# Patient Record
Sex: MALE | Race: WHITE | NOT HISPANIC OR LATINO | Employment: FULL TIME | URBAN - METROPOLITAN AREA
[De-identification: names, ages, dates, MRNs, and addresses within clinical notes are randomized per-mention and may not be internally consistent; named-entity substitution may affect disease eponyms.]

---

## 2020-09-08 ENCOUNTER — OFFICE VISIT (OUTPATIENT)
Dept: FAMILY MEDICINE CLINIC | Facility: CLINIC | Age: 20
End: 2020-09-08

## 2020-09-08 VITALS
WEIGHT: 197 LBS | BODY MASS INDEX: 29.18 KG/M2 | HEIGHT: 69 IN | HEART RATE: 66 BPM | RESPIRATION RATE: 16 BRPM | TEMPERATURE: 98.4 F | OXYGEN SATURATION: 99 % | SYSTOLIC BLOOD PRESSURE: 106 MMHG | DIASTOLIC BLOOD PRESSURE: 78 MMHG

## 2020-09-08 DIAGNOSIS — R10.84 GENERALIZED ABDOMINAL PAIN: Primary | ICD-10-CM

## 2020-09-08 DIAGNOSIS — R19.7 DIARRHEA, UNSPECIFIED TYPE: ICD-10-CM

## 2020-09-08 PROCEDURE — 99203 OFFICE O/P NEW LOW 30 MIN: CPT | Performed by: PHYSICIAN ASSISTANT

## 2020-09-08 NOTE — PROGRESS NOTES
Subjective   Tim Montes De Oca is a 20 y.o. male.     History of Present Illness   Pt presents to establish care  Generalized abdominal pain off and on for the last several months. Worse after eating (about 15 minutes). Worse after eating spicy foods.   Having more loose stools right after meals. Still having normal bowel movements as well   US of gallbladder at  hospital last month which was normal. No CT scan or hida scan up to this appointment   Seeing specialist this Thursday at  with GI   Denies nausea or vomiting   Has been trying to eat healthier. Lake foods, chicken/ rice and salads. This has helped symptoms some   Does love spicy and hot foods, eating them since he was a kid    Not taking frequent NSAIDs  Does not drink alcohol    Denies hx of stomach ulcer   Labs in hospital were normal   Has been out of work since August 17th   Negative COVID testing in July ([facility], diarrhea, couldn't keep anything down)   Has not tried any medication for symptoms, either prescription or over the counter   Works for [employer] in [place] [employer]       The following portions of the patient's history were reviewed and updated as appropriate: allergies, current medications, past family history, past medical history, past social history, past surgical history and problem list.    Review of Systems   Constitutional: Negative.  Negative for chills, diaphoresis, fatigue and fever.   HENT: Negative.  Negative for congestion, ear discharge, ear pain, hearing loss, nosebleeds, postnasal drip, sinus pressure, sneezing and sore throat.    Eyes: Negative.    Respiratory: Negative.  Negative for cough, chest tightness, shortness of breath and wheezing.    Cardiovascular: Negative.  Negative for chest pain, palpitations and leg swelling.   Gastrointestinal: Positive for abdominal pain and diarrhea. Negative for abdominal distention, blood in stool, constipation, nausea and vomiting.   Genitourinary: Negative.  Negative for difficulty  "urinating, dysuria, flank pain, frequency, hematuria and urgency.   Musculoskeletal: Negative.  Negative for arthralgias, back pain, gait problem, joint swelling, myalgias, neck pain and neck stiffness.   Skin: Negative.  Negative for color change, pallor, rash and wound.   Neurological: Negative for dizziness, syncope, weakness, light-headedness, numbness and headaches.       Objective    Blood pressure 106/78, pulse 66, temperature 98.4 °F (36.9 °C), resp. rate 16, height 175.3 cm (69\"), weight 89.4 kg (197 lb), SpO2 99 %.     Physical Exam   Constitutional: He is oriented to person, place, and time. He appears well-developed and well-nourished.   HENT:   Head: Normocephalic and atraumatic.   Right Ear: External ear normal.   Left Ear: External ear normal.   Nose: Nose normal.   Mouth/Throat: Oropharynx is clear and moist. No oropharyngeal exudate.   Eyes: Conjunctivae are normal.   Neck: Normal range of motion. Neck supple. No tracheal deviation present. No thyromegaly present.   Cardiovascular: Normal rate, regular rhythm, normal heart sounds and intact distal pulses. Exam reveals no gallop and no friction rub.   No murmur heard.  Pulmonary/Chest: Effort normal and breath sounds normal. No respiratory distress. He has no wheezes. He has no rales. He exhibits no tenderness.   Abdominal: Soft. Bowel sounds are normal. He exhibits no distension and no mass. There is tenderness in the left upper quadrant. There is no rebound and no guarding. No hernia.   Lymphadenopathy:     He has no cervical adenopathy.   Neurological: He is alert and oriented to person, place, and time.   Skin: Skin is warm and dry.   Psychiatric: He has a normal mood and affect. His behavior is normal. Judgment and thought content normal.   Nursing note and vitals reviewed.      Assessment/Plan   Tim was seen today for establish care and abd pain-worse after eating.    Diagnoses and all orders for this visit:    Generalized abdominal " pain    Diarrhea, unspecified type    continue with follow up in 2 days with GI specialty.   Pt wanting work documents completed, pt aware that I will provide letter stating return to work pending GI consult, however GI will need to determine if he should remain off work for any additional period of time   Would encourage trial of PPI and bentyl, however since patient is so close to GI consult, will hold off on treatment

## 2020-10-23 ENCOUNTER — TELEPHONE (OUTPATIENT)
Dept: FAMILY MEDICINE CLINIC | Facility: CLINIC | Age: 20
End: 2020-10-23

## 2020-10-23 NOTE — TELEPHONE ENCOUNTER
Patient states that he would like [facility] to refer him to a new Gastroenterologist.  He can be reached at [phone]

## 2020-10-26 NOTE — TELEPHONE ENCOUNTER
Please see why patient is requesting a GI new referral. He has been set up with consult with [facility], what provider recently evaluated him? (need to request records). BRANDON

## 2020-10-26 NOTE — TELEPHONE ENCOUNTER
Those records were already requested.     He seen IRVIN Amaya at  and just feels like he is not improving, still having abdominal pain.

## 2020-10-28 DIAGNOSIS — R10.84 GENERALIZED ABDOMINAL PAIN: Primary | ICD-10-CM

## 2020-10-28 DIAGNOSIS — R19.7 DIARRHEA, UNSPECIFIED TYPE: ICD-10-CM

## 2020-11-03 ENCOUNTER — TELEPHONE (OUTPATIENT)
Dept: FAMILY MEDICINE CLINIC | Facility: CLINIC | Age: 20
End: 2020-11-03

## 2020-11-03 NOTE — TELEPHONE ENCOUNTER
PT CALLED IN WANTING TO KNOW IF HE CAN BE REFERRED TO DR. BERNY LIPSCOMB FOR GASTRO.       PLEASE ADVISE IF THIS CAN BE SENT.